# Patient Record
Sex: MALE | Race: OTHER | HISPANIC OR LATINO
[De-identification: names, ages, dates, MRNs, and addresses within clinical notes are randomized per-mention and may not be internally consistent; named-entity substitution may affect disease eponyms.]

---

## 2019-10-17 ENCOUNTER — TRANSCRIPTION ENCOUNTER (OUTPATIENT)
Age: 44
End: 2019-10-17

## 2019-10-17 ENCOUNTER — INPATIENT (INPATIENT)
Facility: HOSPITAL | Age: 44
LOS: 2 days | Discharge: HOME | DRG: 482 | End: 2019-10-20
Attending: SURGERY | Admitting: SURGERY
Payer: COMMERCIAL

## 2019-10-17 VITALS
WEIGHT: 205.03 LBS | TEMPERATURE: 98 F | OXYGEN SATURATION: 98 % | HEART RATE: 95 BPM | HEIGHT: 65 IN | DIASTOLIC BLOOD PRESSURE: 82 MMHG | SYSTOLIC BLOOD PRESSURE: 155 MMHG | RESPIRATION RATE: 18 BRPM

## 2019-10-17 DIAGNOSIS — S72.91XA UNSPECIFIED FRACTURE OF RIGHT FEMUR, INITIAL ENCOUNTER FOR CLOSED FRACTURE: ICD-10-CM

## 2019-10-17 LAB
ALBUMIN SERPL ELPH-MCNC: 4.3 G/DL — SIGNIFICANT CHANGE UP (ref 3.3–5.2)
ALP SERPL-CCNC: 79 U/L — SIGNIFICANT CHANGE UP (ref 40–120)
ALT FLD-CCNC: 33 U/L — SIGNIFICANT CHANGE UP
ANION GAP SERPL CALC-SCNC: 12 MMOL/L — SIGNIFICANT CHANGE UP (ref 5–17)
APTT BLD: 26.8 SEC — LOW (ref 27.5–36.3)
AST SERPL-CCNC: 26 U/L — SIGNIFICANT CHANGE UP
BASOPHILS # BLD AUTO: 0.05 K/UL — SIGNIFICANT CHANGE UP (ref 0–0.2)
BASOPHILS NFR BLD AUTO: 0.4 % — SIGNIFICANT CHANGE UP (ref 0–2)
BILIRUB SERPL-MCNC: <0.2 MG/DL — LOW (ref 0.4–2)
BLD GP AB SCN SERPL QL: SIGNIFICANT CHANGE UP
BUN SERPL-MCNC: 12 MG/DL — SIGNIFICANT CHANGE UP (ref 8–20)
CALCIUM SERPL-MCNC: 9.1 MG/DL — SIGNIFICANT CHANGE UP (ref 8.6–10.2)
CHLORIDE SERPL-SCNC: 102 MMOL/L — SIGNIFICANT CHANGE UP (ref 98–107)
CO2 SERPL-SCNC: 26 MMOL/L — SIGNIFICANT CHANGE UP (ref 22–29)
CREAT SERPL-MCNC: 0.72 MG/DL — SIGNIFICANT CHANGE UP (ref 0.5–1.3)
EOSINOPHIL # BLD AUTO: 0.09 K/UL — SIGNIFICANT CHANGE UP (ref 0–0.5)
EOSINOPHIL NFR BLD AUTO: 0.7 % — SIGNIFICANT CHANGE UP (ref 0–6)
GLUCOSE SERPL-MCNC: 109 MG/DL — SIGNIFICANT CHANGE UP (ref 70–115)
HCT VFR BLD CALC: 44.9 % — SIGNIFICANT CHANGE UP (ref 39–50)
HGB BLD-MCNC: 14.3 G/DL — SIGNIFICANT CHANGE UP (ref 13–17)
IMM GRANULOCYTES NFR BLD AUTO: 0.2 % — SIGNIFICANT CHANGE UP (ref 0–1.5)
INR BLD: 0.93 RATIO — SIGNIFICANT CHANGE UP (ref 0.88–1.16)
LYMPHOCYTES # BLD AUTO: 2.74 K/UL — SIGNIFICANT CHANGE UP (ref 1–3.3)
LYMPHOCYTES # BLD AUTO: 22.7 % — SIGNIFICANT CHANGE UP (ref 13–44)
MCHC RBC-ENTMCNC: 27.8 PG — SIGNIFICANT CHANGE UP (ref 27–34)
MCHC RBC-ENTMCNC: 31.8 GM/DL — LOW (ref 32–36)
MCV RBC AUTO: 87.4 FL — SIGNIFICANT CHANGE UP (ref 80–100)
MONOCYTES # BLD AUTO: 0.83 K/UL — SIGNIFICANT CHANGE UP (ref 0–0.9)
MONOCYTES NFR BLD AUTO: 6.9 % — SIGNIFICANT CHANGE UP (ref 2–14)
NEUTROPHILS # BLD AUTO: 8.33 K/UL — HIGH (ref 1.8–7.4)
NEUTROPHILS NFR BLD AUTO: 69.1 % — SIGNIFICANT CHANGE UP (ref 43–77)
PLATELET # BLD AUTO: 212 K/UL — SIGNIFICANT CHANGE UP (ref 150–400)
POTASSIUM SERPL-MCNC: 4.2 MMOL/L — SIGNIFICANT CHANGE UP (ref 3.5–5.3)
POTASSIUM SERPL-SCNC: 4.2 MMOL/L — SIGNIFICANT CHANGE UP (ref 3.5–5.3)
PROT SERPL-MCNC: 7.4 G/DL — SIGNIFICANT CHANGE UP (ref 6.6–8.7)
PROTHROM AB SERPL-ACNC: 10.7 SEC — SIGNIFICANT CHANGE UP (ref 10–12.9)
RBC # BLD: 5.14 M/UL — SIGNIFICANT CHANGE UP (ref 4.2–5.8)
RBC # FLD: 12 % — SIGNIFICANT CHANGE UP (ref 10.3–14.5)
SODIUM SERPL-SCNC: 140 MMOL/L — SIGNIFICANT CHANGE UP (ref 135–145)
WBC # BLD: 12.07 K/UL — HIGH (ref 3.8–10.5)
WBC # FLD AUTO: 12.07 K/UL — HIGH (ref 3.8–10.5)

## 2019-10-17 PROCEDURE — 99221 1ST HOSP IP/OBS SF/LOW 40: CPT | Mod: 57

## 2019-10-17 PROCEDURE — 72128 CT CHEST SPINE W/O DYE: CPT | Mod: 26

## 2019-10-17 PROCEDURE — 73590 X-RAY EXAM OF LOWER LEG: CPT | Mod: 26,RT

## 2019-10-17 PROCEDURE — 71045 X-RAY EXAM CHEST 1 VIEW: CPT | Mod: 26

## 2019-10-17 PROCEDURE — 99284 EMERGENCY DEPT VISIT MOD MDM: CPT

## 2019-10-17 PROCEDURE — 73552 X-RAY EXAM OF FEMUR 2/>: CPT | Mod: 26,RT

## 2019-10-17 PROCEDURE — 73564 X-RAY EXAM KNEE 4 OR MORE: CPT | Mod: 26,RT

## 2019-10-17 PROCEDURE — 73700 CT LOWER EXTREMITY W/O DYE: CPT | Mod: 26,RT

## 2019-10-17 PROCEDURE — 72125 CT NECK SPINE W/O DYE: CPT | Mod: 26

## 2019-10-17 PROCEDURE — 73610 X-RAY EXAM OF ANKLE: CPT | Mod: 26,RT

## 2019-10-17 PROCEDURE — 72131 CT LUMBAR SPINE W/O DYE: CPT | Mod: 26

## 2019-10-17 PROCEDURE — 93010 ELECTROCARDIOGRAM REPORT: CPT

## 2019-10-17 PROCEDURE — 73522 X-RAY EXAM HIPS BI 3-4 VIEWS: CPT | Mod: 26

## 2019-10-17 RX ORDER — SODIUM CHLORIDE 9 MG/ML
1000 INJECTION, SOLUTION INTRAVENOUS
Refills: 0 | Status: DISCONTINUED | OUTPATIENT
Start: 2019-10-17 | End: 2019-10-17

## 2019-10-17 RX ORDER — HYDROMORPHONE HYDROCHLORIDE 2 MG/ML
0.5 INJECTION INTRAMUSCULAR; INTRAVENOUS; SUBCUTANEOUS
Refills: 0 | Status: DISCONTINUED | OUTPATIENT
Start: 2019-10-17 | End: 2019-10-18

## 2019-10-17 RX ORDER — ONDANSETRON 8 MG/1
4 TABLET, FILM COATED ORAL EVERY 6 HOURS
Refills: 0 | Status: DISCONTINUED | OUTPATIENT
Start: 2019-10-17 | End: 2019-10-18

## 2019-10-17 RX ORDER — ENOXAPARIN SODIUM 100 MG/ML
30 INJECTION SUBCUTANEOUS ONCE
Refills: 0 | Status: COMPLETED | OUTPATIENT
Start: 2019-10-17 | End: 2019-10-17

## 2019-10-17 RX ORDER — SENNA PLUS 8.6 MG/1
2 TABLET ORAL AT BEDTIME
Refills: 0 | Status: DISCONTINUED | OUTPATIENT
Start: 2019-10-17 | End: 2019-10-18

## 2019-10-17 RX ORDER — MORPHINE SULFATE 50 MG/1
4 CAPSULE, EXTENDED RELEASE ORAL ONCE
Refills: 0 | Status: DISCONTINUED | OUTPATIENT
Start: 2019-10-17 | End: 2019-10-17

## 2019-10-17 RX ADMIN — SENNA PLUS 2 TABLET(S): 8.6 TABLET ORAL at 22:05

## 2019-10-17 RX ADMIN — MORPHINE SULFATE 4 MILLIGRAM(S): 50 CAPSULE, EXTENDED RELEASE ORAL at 16:39

## 2019-10-17 RX ADMIN — ENOXAPARIN SODIUM 30 MILLIGRAM(S): 100 INJECTION SUBCUTANEOUS at 20:01

## 2019-10-17 RX ADMIN — MORPHINE SULFATE 4 MILLIGRAM(S): 50 CAPSULE, EXTENDED RELEASE ORAL at 19:56

## 2019-10-17 NOTE — ED PROVIDER NOTE - ATTENDING CONTRIBUTION TO CARE
I, Violet Morrell, performed the initial face to face bedside interview with this patient regarding history of present illness, review of symptoms and relevant past medical, social and family history.  I completed an independent physical examination.  I was the initial provider who evaluated this patient. I have signed out the follow up of any pending tests (i.e. labs, radiological studies) to the ACP.  I have communicated the patient’s plan of care and disposition with the ACP.  fell off truck + femur fracture  to be admitted to either trauma or ortho.  agree w care plan

## 2019-10-17 NOTE — H&P ADULT - ASSESSMENT
This is a 44 year old M with no PMHx who presents to the ED s/p trip and fall off a loading truck while at work today.  Now with a distal femur fx  -Given mechanism, will order full spine Ct-scans  -Admit to Trauma  -Preop for OR 18/19 with Ortho  -DVT, hold AM dose

## 2019-10-17 NOTE — ED PROVIDER NOTE - PHYSICAL EXAMINATION
Awake, alert, mild distress secondary to pain   Lungs CTA, = chest rise and fall   abd soft and nontender to palp  R knee with obvious deformity, diffusely TTP, large amount of swelling above the R knee, R femur TTP, R ankle nontender to palp, able to move all toes, able to flex and extend ankle, unable to flex or extend knee  + DP and PT pulses  A/O x 3, no focal neuro deficits, nl sensation to all toes   No lacs, abrasions noted

## 2019-10-17 NOTE — H&P ADULT - HISTORY OF PRESENT ILLNESS
This is a 44 year old M with no PMHx who presents to the ED s/p trip and fall off a loading truck while at work today. Pt states he was unloading items from the truck and as he was walking backwards he missed a step and landed on standing position. His right knee "gave away". Unable to ambulate after accident. Currently c/o R knee, R leg and R hip pain. Denies trauma to the head, no LOC. Denies HA, diplopia, chest pain, SOB, abdominal pain, upper extremity pain, left lower extremity pain or back pain.

## 2019-10-17 NOTE — CONSULT NOTE ADULT - ATTENDING COMMENTS
Ortho Trauma Attending:  Agree with above resident note.  Note edited where necessary.  Orthopedic Surgery is ready to proceed with surgery pending medical optimization and adequate Operating Room availability. Planning on surgery tomorrow for ORIF femur.     Gopal Madsen MD  Orthopaedic Trauma Surgery

## 2019-10-17 NOTE — ED ADULT NURSE NOTE - OBJECTIVE STATEMENT
43yo male c/o slip/fall out of work van this am landing on right leg. pt states he was unable to bear weight to RLE after incident. pain increasing. denies loc, hitting head or anticoagulant use.

## 2019-10-17 NOTE — H&P ADULT - NSHPPHYSICALEXAM_GEN_ALL_CORE
Gen: AAOX3, NAD  Head: AT/NC  Eyes: pupil 4mm bilaterally equally reactive, EOMI  Neck: no cervical spine tenderness or step-off  Chest: CTAB, no chest wall tenderness or crepitus  Abd: S, ND, NT  Pelvis: stable  Ext: RLE in a knee immobilizer, motor intact at the right foot, motor 5/5 on all other extremities, no deformities  Vasc: bounding pulses all throughout   Back: no spinal tenderness, no step off  Neuro: no focal deficits, sensory intact, CN II-XII intact

## 2019-10-17 NOTE — CONSULT NOTE ADULT - SUBJECTIVE AND OBJECTIVE BOX
Patient is a 44 year old Hungarian-speaking male presents with right knee pain status post mechanical fall at work. Patient was a community ambulator at baseline. Patient denies numbness, tingling, or paresthesias in affected limb. Patient denies headstrike or loss of consciousness and denies any other orthopedic injuries at this time. He states he does take "freon" which is a [place] pain medicine he gets at the [facility]. He states it is comparable to a tylenol.     PAST MEDICAL & SURGICAL HISTORY:  Denies  Chronic deformity of femur and hip.     Allergies    No Known Allergies    Intolerances      Imaging: Xrays of the right knee demonstrates a medial distal femur fracture.     Physical Exam:  General: Not in acute distress  Right Lower Extremity: Skin intact. No tenderness to palpation of the Hip/Ankle/Foot/Toes. Positive log roll test and positive heel strike. Extensor hallucis longus, Flexor hallucis longus, Tibialis anterior, and Gastrocnemius/Soleus complex intact. Sensation intact to light touch in L2-S1 nerve distribution. Dorsalis Pedis and Posterior Tibialis pulses are 2+. Compartments are soft and compressible. No tenderness to palpation of calves.     Secondary Survey: Full range of motion of unaffected extremities, sensation intact to light touch globally, compartments soft, no bony tenderness to palpation over bony prominences, no calf tenderness to palpation, no tenderness to palpation along axial spine.

## 2019-10-17 NOTE — ED ADULT NURSE REASSESSMENT NOTE - NS ED NURSE REASSESS COMMENT FT1
RLE immobilizer and ace wrap applied by ortho. toes warm and mobile, cap refil brisk, sensation intact.
Report received from offgoing RN, charting as noted. Patient is A&Ox4, c/o some leg pain but states he just received medication.   at bedside for assistance. Patient has right lower extremity immobilized with ace bandage and knee immobilizer in place.

## 2019-10-17 NOTE — ED PROVIDER NOTE - OBJECTIVE STATEMENT
44 year old male presents to the ED s/p trip and fall off a truck while at work today. Pt states he was stepping down from his truck when he lost his footing and landed on his R knee while it was bent. Since the fall pt has been unable to walk. Currently c/o R knee, R leg and R hip pain. Pt did not hit his head, no LOC. Not on blood thinners. Denies lacs, abrasions, HA, abd pain, SOB, N/V/D.

## 2019-10-17 NOTE — ED PROVIDER NOTE - CLINICAL SUMMARY MEDICAL DECISION MAKING FREE TEXT BOX
44 year old male presents to the ED for R knee pain s/p falling on bent knee while stepping down from a truck. Will get xrays R knee, femur, hip, tib/fib and ankle, and treat pain.

## 2019-10-17 NOTE — CONSULT NOTE ADULT - ASSESSMENT
43 y/o male with right distal femur fx    -OR for ORIF on 10/18/19  -NPO after midnight except meds  -Hold anticoagulation after midnight  -IV fluids while npo  -NWB on RLE in bulky ponce knee immobilizer  -Dr. Madsen aware and agrees with the above

## 2019-10-18 LAB
ABO RH CONFIRMATION: SIGNIFICANT CHANGE UP
ANION GAP SERPL CALC-SCNC: 9 MMOL/L — SIGNIFICANT CHANGE UP (ref 5–17)
BASOPHILS # BLD AUTO: 0.04 K/UL — SIGNIFICANT CHANGE UP (ref 0–0.2)
BASOPHILS NFR BLD AUTO: 0.4 % — SIGNIFICANT CHANGE UP (ref 0–2)
BUN SERPL-MCNC: 12 MG/DL — SIGNIFICANT CHANGE UP (ref 8–20)
CALCIUM SERPL-MCNC: 9 MG/DL — SIGNIFICANT CHANGE UP (ref 8.6–10.2)
CHLORIDE SERPL-SCNC: 101 MMOL/L — SIGNIFICANT CHANGE UP (ref 98–107)
CO2 SERPL-SCNC: 29 MMOL/L — SIGNIFICANT CHANGE UP (ref 22–29)
CREAT SERPL-MCNC: 0.74 MG/DL — SIGNIFICANT CHANGE UP (ref 0.5–1.3)
EOSINOPHIL # BLD AUTO: 0.13 K/UL — SIGNIFICANT CHANGE UP (ref 0–0.5)
EOSINOPHIL NFR BLD AUTO: 1.3 % — SIGNIFICANT CHANGE UP (ref 0–6)
GLUCOSE SERPL-MCNC: 115 MG/DL — SIGNIFICANT CHANGE UP (ref 70–115)
HCT VFR BLD CALC: 43 % — SIGNIFICANT CHANGE UP (ref 39–50)
HGB BLD-MCNC: 13.7 G/DL — SIGNIFICANT CHANGE UP (ref 13–17)
IMM GRANULOCYTES NFR BLD AUTO: 0.4 % — SIGNIFICANT CHANGE UP (ref 0–1.5)
LYMPHOCYTES # BLD AUTO: 2.46 K/UL — SIGNIFICANT CHANGE UP (ref 1–3.3)
LYMPHOCYTES # BLD AUTO: 24.8 % — SIGNIFICANT CHANGE UP (ref 13–44)
MAGNESIUM SERPL-MCNC: 2.1 MG/DL — SIGNIFICANT CHANGE UP (ref 1.6–2.6)
MCHC RBC-ENTMCNC: 27.8 PG — SIGNIFICANT CHANGE UP (ref 27–34)
MCHC RBC-ENTMCNC: 31.9 GM/DL — LOW (ref 32–36)
MCV RBC AUTO: 87.4 FL — SIGNIFICANT CHANGE UP (ref 80–100)
MONOCYTES # BLD AUTO: 0.87 K/UL — SIGNIFICANT CHANGE UP (ref 0–0.9)
MONOCYTES NFR BLD AUTO: 8.8 % — SIGNIFICANT CHANGE UP (ref 2–14)
NEUTROPHILS # BLD AUTO: 6.36 K/UL — SIGNIFICANT CHANGE UP (ref 1.8–7.4)
NEUTROPHILS NFR BLD AUTO: 64.3 % — SIGNIFICANT CHANGE UP (ref 43–77)
PLATELET # BLD AUTO: 199 K/UL — SIGNIFICANT CHANGE UP (ref 150–400)
POTASSIUM SERPL-MCNC: 4.4 MMOL/L — SIGNIFICANT CHANGE UP (ref 3.5–5.3)
POTASSIUM SERPL-SCNC: 4.4 MMOL/L — SIGNIFICANT CHANGE UP (ref 3.5–5.3)
RBC # BLD: 4.92 M/UL — SIGNIFICANT CHANGE UP (ref 4.2–5.8)
RBC # FLD: 12.3 % — SIGNIFICANT CHANGE UP (ref 10.3–14.5)
SODIUM SERPL-SCNC: 139 MMOL/L — SIGNIFICANT CHANGE UP (ref 135–145)
WBC # BLD: 9.9 K/UL — SIGNIFICANT CHANGE UP (ref 3.8–10.5)
WBC # FLD AUTO: 9.9 K/UL — SIGNIFICANT CHANGE UP (ref 3.8–10.5)

## 2019-10-18 PROCEDURE — 27514 TREATMENT OF THIGH FRACTURE: CPT | Mod: RT

## 2019-10-18 PROCEDURE — 73562 X-RAY EXAM OF KNEE 3: CPT | Mod: 26,RT

## 2019-10-18 PROCEDURE — 99232 SBSQ HOSP IP/OBS MODERATE 35: CPT

## 2019-10-18 PROCEDURE — 27514 TREATMENT OF THIGH FRACTURE: CPT | Mod: AS,RT

## 2019-10-18 RX ORDER — OXYCODONE HYDROCHLORIDE 5 MG/1
10 TABLET ORAL EVERY 4 HOURS
Refills: 0 | Status: DISCONTINUED | OUTPATIENT
Start: 2019-10-18 | End: 2019-10-20

## 2019-10-18 RX ORDER — SODIUM CHLORIDE 9 MG/ML
1000 INJECTION, SOLUTION INTRAVENOUS
Refills: 0 | Status: DISCONTINUED | OUTPATIENT
Start: 2019-10-18 | End: 2019-10-18

## 2019-10-18 RX ORDER — OXYCODONE HYDROCHLORIDE 5 MG/1
5 TABLET ORAL EVERY 4 HOURS
Refills: 0 | Status: DISCONTINUED | OUTPATIENT
Start: 2019-10-18 | End: 2019-10-20

## 2019-10-18 RX ORDER — SODIUM CHLORIDE 9 MG/ML
1000 INJECTION, SOLUTION INTRAVENOUS
Refills: 0 | Status: DISCONTINUED | OUTPATIENT
Start: 2019-10-18 | End: 2019-10-19

## 2019-10-18 RX ORDER — HYDROMORPHONE HYDROCHLORIDE 2 MG/ML
0.5 INJECTION INTRAMUSCULAR; INTRAVENOUS; SUBCUTANEOUS
Refills: 0 | Status: DISCONTINUED | OUTPATIENT
Start: 2019-10-18 | End: 2019-10-18

## 2019-10-18 RX ORDER — ONDANSETRON 8 MG/1
4 TABLET, FILM COATED ORAL ONCE
Refills: 0 | Status: DISCONTINUED | OUTPATIENT
Start: 2019-10-18 | End: 2019-10-20

## 2019-10-18 RX ORDER — CEFAZOLIN SODIUM 1 G
2000 VIAL (EA) INJECTION
Refills: 0 | Status: COMPLETED | OUTPATIENT
Start: 2019-10-18 | End: 2019-10-19

## 2019-10-18 RX ORDER — SENNA PLUS 8.6 MG/1
2 TABLET ORAL AT BEDTIME
Refills: 0 | Status: DISCONTINUED | OUTPATIENT
Start: 2019-10-18 | End: 2019-10-20

## 2019-10-18 RX ORDER — ACETAMINOPHEN 500 MG
80 TABLET ORAL EVERY 6 HOURS
Refills: 0 | Status: DISCONTINUED | OUTPATIENT
Start: 2019-10-18 | End: 2019-10-18

## 2019-10-18 RX ORDER — CEFAZOLIN SODIUM 1 G
2000 VIAL (EA) INJECTION ONCE
Refills: 0 | Status: DISCONTINUED | OUTPATIENT
Start: 2019-10-18 | End: 2019-10-18

## 2019-10-18 RX ORDER — ACETAMINOPHEN 500 MG
650 TABLET ORAL EVERY 6 HOURS
Refills: 0 | Status: DISCONTINUED | OUTPATIENT
Start: 2019-10-18 | End: 2019-10-19

## 2019-10-18 RX ORDER — HYDROMORPHONE HYDROCHLORIDE 2 MG/ML
1 INJECTION INTRAMUSCULAR; INTRAVENOUS; SUBCUTANEOUS
Refills: 0 | Status: DISCONTINUED | OUTPATIENT
Start: 2019-10-18 | End: 2019-10-18

## 2019-10-18 RX ORDER — ONDANSETRON 8 MG/1
4 TABLET, FILM COATED ORAL ONCE
Refills: 0 | Status: DISCONTINUED | OUTPATIENT
Start: 2019-10-18 | End: 2019-10-18

## 2019-10-18 RX ORDER — ENOXAPARIN SODIUM 100 MG/ML
30 INJECTION SUBCUTANEOUS
Refills: 0 | Status: DISCONTINUED | OUTPATIENT
Start: 2019-10-19 | End: 2019-10-20

## 2019-10-18 RX ADMIN — SODIUM CHLORIDE 120 MILLILITER(S): 9 INJECTION, SOLUTION INTRAVENOUS at 05:36

## 2019-10-18 RX ADMIN — SENNA PLUS 2 TABLET(S): 8.6 TABLET ORAL at 21:46

## 2019-10-18 RX ADMIN — SODIUM CHLORIDE 120 MILLILITER(S): 9 INJECTION, SOLUTION INTRAVENOUS at 09:26

## 2019-10-18 RX ADMIN — HYDROMORPHONE HYDROCHLORIDE 0.5 MILLIGRAM(S): 2 INJECTION INTRAMUSCULAR; INTRAVENOUS; SUBCUTANEOUS at 09:26

## 2019-10-18 RX ADMIN — HYDROMORPHONE HYDROCHLORIDE 0.5 MILLIGRAM(S): 2 INJECTION INTRAMUSCULAR; INTRAVENOUS; SUBCUTANEOUS at 05:36

## 2019-10-18 RX ADMIN — HYDROMORPHONE HYDROCHLORIDE 0.5 MILLIGRAM(S): 2 INJECTION INTRAMUSCULAR; INTRAVENOUS; SUBCUTANEOUS at 07:41

## 2019-10-18 RX ADMIN — Medication 100 MILLIGRAM(S): at 21:46

## 2019-10-19 ENCOUNTER — TRANSCRIPTION ENCOUNTER (OUTPATIENT)
Age: 44
End: 2019-10-19

## 2019-10-19 LAB
ANION GAP SERPL CALC-SCNC: 10 MMOL/L — SIGNIFICANT CHANGE UP (ref 5–17)
BASOPHILS # BLD AUTO: 0.03 K/UL — SIGNIFICANT CHANGE UP (ref 0–0.2)
BASOPHILS NFR BLD AUTO: 0.2 % — SIGNIFICANT CHANGE UP (ref 0–2)
BUN SERPL-MCNC: 11 MG/DL — SIGNIFICANT CHANGE UP (ref 8–20)
CALCIUM SERPL-MCNC: 8.2 MG/DL — LOW (ref 8.6–10.2)
CHLORIDE SERPL-SCNC: 102 MMOL/L — SIGNIFICANT CHANGE UP (ref 98–107)
CO2 SERPL-SCNC: 28 MMOL/L — SIGNIFICANT CHANGE UP (ref 22–29)
CREAT SERPL-MCNC: 0.72 MG/DL — SIGNIFICANT CHANGE UP (ref 0.5–1.3)
EOSINOPHIL # BLD AUTO: 0.01 K/UL — SIGNIFICANT CHANGE UP (ref 0–0.5)
EOSINOPHIL NFR BLD AUTO: 0.1 % — SIGNIFICANT CHANGE UP (ref 0–6)
GLUCOSE SERPL-MCNC: 138 MG/DL — HIGH (ref 70–115)
HCT VFR BLD CALC: 38 % — LOW (ref 39–50)
HGB BLD-MCNC: 12 G/DL — LOW (ref 13–17)
IMM GRANULOCYTES NFR BLD AUTO: 0.5 % — SIGNIFICANT CHANGE UP (ref 0–1.5)
LYMPHOCYTES # BLD AUTO: 1.89 K/UL — SIGNIFICANT CHANGE UP (ref 1–3.3)
LYMPHOCYTES # BLD AUTO: 14.7 % — SIGNIFICANT CHANGE UP (ref 13–44)
MAGNESIUM SERPL-MCNC: 1.9 MG/DL — SIGNIFICANT CHANGE UP (ref 1.6–2.6)
MCHC RBC-ENTMCNC: 27.5 PG — SIGNIFICANT CHANGE UP (ref 27–34)
MCHC RBC-ENTMCNC: 31.6 GM/DL — LOW (ref 32–36)
MCV RBC AUTO: 87 FL — SIGNIFICANT CHANGE UP (ref 80–100)
MONOCYTES # BLD AUTO: 1.18 K/UL — HIGH (ref 0–0.9)
MONOCYTES NFR BLD AUTO: 9.2 % — SIGNIFICANT CHANGE UP (ref 2–14)
NEUTROPHILS # BLD AUTO: 9.66 K/UL — HIGH (ref 1.8–7.4)
NEUTROPHILS NFR BLD AUTO: 75.3 % — SIGNIFICANT CHANGE UP (ref 43–77)
PHOSPHATE SERPL-MCNC: 3.1 MG/DL — SIGNIFICANT CHANGE UP (ref 2.4–4.7)
PLATELET # BLD AUTO: 186 K/UL — SIGNIFICANT CHANGE UP (ref 150–400)
POTASSIUM SERPL-MCNC: 4.1 MMOL/L — SIGNIFICANT CHANGE UP (ref 3.5–5.3)
POTASSIUM SERPL-SCNC: 4.1 MMOL/L — SIGNIFICANT CHANGE UP (ref 3.5–5.3)
RBC # BLD: 4.37 M/UL — SIGNIFICANT CHANGE UP (ref 4.2–5.8)
RBC # FLD: 12.3 % — SIGNIFICANT CHANGE UP (ref 10.3–14.5)
SODIUM SERPL-SCNC: 140 MMOL/L — SIGNIFICANT CHANGE UP (ref 135–145)
WBC # BLD: 12.84 K/UL — HIGH (ref 3.8–10.5)
WBC # FLD AUTO: 12.84 K/UL — HIGH (ref 3.8–10.5)

## 2019-10-19 PROCEDURE — 99024 POSTOP FOLLOW-UP VISIT: CPT

## 2019-10-19 RX ORDER — MAGNESIUM SULFATE 500 MG/ML
2 VIAL (ML) INJECTION ONCE
Refills: 0 | Status: COMPLETED | OUTPATIENT
Start: 2019-10-19 | End: 2019-10-19

## 2019-10-19 RX ORDER — ACETAMINOPHEN 500 MG
650 TABLET ORAL EVERY 4 HOURS
Refills: 0 | Status: DISCONTINUED | OUTPATIENT
Start: 2019-10-19 | End: 2019-10-20

## 2019-10-19 RX ORDER — INFLUENZA VIRUS VACCINE 15; 15; 15; 15 UG/.5ML; UG/.5ML; UG/.5ML; UG/.5ML
0.5 SUSPENSION INTRAMUSCULAR ONCE
Refills: 0 | Status: DISCONTINUED | OUTPATIENT
Start: 2019-10-19 | End: 2019-10-20

## 2019-10-19 RX ORDER — HYDROMORPHONE HYDROCHLORIDE 2 MG/ML
0.5 INJECTION INTRAMUSCULAR; INTRAVENOUS; SUBCUTANEOUS EVERY 4 HOURS
Refills: 0 | Status: DISCONTINUED | OUTPATIENT
Start: 2019-10-19 | End: 2019-10-20

## 2019-10-19 RX ADMIN — OXYCODONE HYDROCHLORIDE 10 MILLIGRAM(S): 5 TABLET ORAL at 09:15

## 2019-10-19 RX ADMIN — ENOXAPARIN SODIUM 30 MILLIGRAM(S): 100 INJECTION SUBCUTANEOUS at 17:11

## 2019-10-19 RX ADMIN — Medication 100 MILLIGRAM(S): at 04:10

## 2019-10-19 RX ADMIN — ENOXAPARIN SODIUM 30 MILLIGRAM(S): 100 INJECTION SUBCUTANEOUS at 05:17

## 2019-10-19 RX ADMIN — OXYCODONE HYDROCHLORIDE 5 MILLIGRAM(S): 5 TABLET ORAL at 01:39

## 2019-10-19 RX ADMIN — Medication 50 GRAM(S): at 09:53

## 2019-10-19 RX ADMIN — SENNA PLUS 2 TABLET(S): 8.6 TABLET ORAL at 21:48

## 2019-10-19 RX ADMIN — OXYCODONE HYDROCHLORIDE 10 MILLIGRAM(S): 5 TABLET ORAL at 08:18

## 2019-10-19 RX ADMIN — OXYCODONE HYDROCHLORIDE 5 MILLIGRAM(S): 5 TABLET ORAL at 02:05

## 2019-10-19 NOTE — DISCHARGE NOTE PROVIDER - HOSPITAL COURSE
Pt is a 44 year old male admitted with a R sided femur fracture. He was evaluated by orthopedics and taken to the OR on 10/18 for ORIF of his R femur. He did well post operatively. He was evaluated by PT who trained him on the use of crutches and recommended discharge home without outpatient PT. Patient is stable: tolerating diet, voiding, ambulating, pain well controlled.

## 2019-10-19 NOTE — DISCHARGE NOTE PROVIDER - CARE PROVIDER_API CALL
Gopal Madsen)  Orthopaedic Surgery  [address] [place] [address]  Phone: [phone]  Fax: [phone]  Follow Up Time:

## 2019-10-19 NOTE — PHYSICAL THERAPY INITIAL EVALUATION ADULT - PERTINENT HX OF CURRENT PROBLEM, REHAB EVAL
pt presents to [facility] due to s/p fall/trip off landing deck, s/p right distal femur fracture, right fibula head fracture, lateral tibial plateau fracture s/p ORIF of right distal femur 10/18

## 2019-10-19 NOTE — DISCHARGE NOTE PROVIDER - NSDCFUADDINST_GEN_ALL_CORE_FT
ORTHOPEDIC DISCHARGE INSTRUCTIONS: The patient will be seen in the office on 11/8/19. Call office to confirm appointment. The patient will contact the office if the wound becomes red, has increasing pain, develops bleeding or discharge, an injury occurs, or has other concerns. The patient will continue  ASPIRIN for DVTP . The patient will take Oxycodone and Tylenol for pain control and titrate according to prescription and patient needs. The patient is NON-weight bearing on the Right lower extremity, full Range of motion of knee is ok. The patient is recommended to elevated the affected extremity to reduce swelling. ORTHOPEDIC DISCHARGE INSTRUCTIONS: The patient will be seen in the office on 11/8/19. Call office to confirm appointment. The patient will contact the office if the wound becomes red, has increasing pain, develops bleeding or discharge, an injury occurs, or has other concerns. The patient will continue  ASPIRIN for DVT prophylaxis . The patient will take Oxycodone and Tylenol for pain control and titrate according to prescription and patient needs. The patient is NON-weight bearing on the Right lower extremity, full Range of motion of knee is ok. The patient is recommended to elevated the affected extremity to reduce swelling. ORTHOPEDIC DISCHARGE INSTRUCTIONS: The patient will be seen in the office on 11/8/19. Call office to confirm appointment. The patient will contact the office if the wound becomes red, has increasing pain, develops bleeding or discharge, an injury occurs, or has other concerns. The patient will continue  ASPIRIN 325 BID for DVT prophylaxis . The patient will take Oxycodone and Tylenol for pain control and titrate according to prescription and patient needs. The patient is NON-weight bearing on the Right lower extremity, full Range of motion of knee is ok. The patient is recommended to elevated the affected extremity to reduce swelling.

## 2019-10-19 NOTE — DISCHARGE NOTE PROVIDER - NSDCCPCAREPLAN_GEN_ALL_CORE_FT
PRINCIPAL DISCHARGE DIAGNOSIS  Diagnosis: Femur fracture, right  Assessment and Plan of Treatment: PRINCIPAL DISCHARGE DIAGNOSIS  Diagnosis: Femur fracture, right  Assessment and Plan of Treatment: Non-weight bearing on the right lower extremitiy. Follow up with Dr. Madsen 11/8/19. Continue physical therapy. Continue regular diet. PRINCIPAL DISCHARGE DIAGNOSIS  Diagnosis: Femur fracture, right  Assessment and Plan of Treatment: Non-weight bearing on the right lower extremitiy. Follow up with Dr. Madsen 2-3 weeks. Continue physical therapy. Continue regular diet. Please take aspirin 325mg twice daily until follow up. For pain, please take tylenol 650mg every 6 hours as needed. If pain is uncontrolled, please take oxycodone 5mg every 8 hours as needed for breakthrough pain

## 2019-10-20 ENCOUNTER — TRANSCRIPTION ENCOUNTER (OUTPATIENT)
Age: 44
End: 2019-10-20

## 2019-10-20 VITALS
SYSTOLIC BLOOD PRESSURE: 144 MMHG | OXYGEN SATURATION: 99 % | HEART RATE: 90 BPM | RESPIRATION RATE: 19 BRPM | DIASTOLIC BLOOD PRESSURE: 84 MMHG | TEMPERATURE: 99 F

## 2019-10-20 DIAGNOSIS — S72.91XA UNSPECIFIED FRACTURE OF RIGHT FEMUR, INITIAL ENCOUNTER FOR CLOSED FRACTURE: ICD-10-CM

## 2019-10-20 PROCEDURE — 86901 BLOOD TYPING SEROLOGIC RH(D): CPT

## 2019-10-20 PROCEDURE — 76000 FLUOROSCOPY <1 HR PHYS/QHP: CPT

## 2019-10-20 PROCEDURE — 36415 COLL VENOUS BLD VENIPUNCTURE: CPT

## 2019-10-20 PROCEDURE — 99285 EMERGENCY DEPT VISIT HI MDM: CPT | Mod: 25

## 2019-10-20 PROCEDURE — 99232 SBSQ HOSP IP/OBS MODERATE 35: CPT

## 2019-10-20 PROCEDURE — 72125 CT NECK SPINE W/O DYE: CPT

## 2019-10-20 PROCEDURE — 85610 PROTHROMBIN TIME: CPT

## 2019-10-20 PROCEDURE — 85730 THROMBOPLASTIN TIME PARTIAL: CPT

## 2019-10-20 PROCEDURE — 86850 RBC ANTIBODY SCREEN: CPT

## 2019-10-20 PROCEDURE — 83735 ASSAY OF MAGNESIUM: CPT

## 2019-10-20 PROCEDURE — 86900 BLOOD TYPING SEROLOGIC ABO: CPT

## 2019-10-20 PROCEDURE — 80048 BASIC METABOLIC PNL TOTAL CA: CPT

## 2019-10-20 PROCEDURE — 73610 X-RAY EXAM OF ANKLE: CPT

## 2019-10-20 PROCEDURE — 84100 ASSAY OF PHOSPHORUS: CPT

## 2019-10-20 PROCEDURE — 96374 THER/PROPH/DIAG INJ IV PUSH: CPT

## 2019-10-20 PROCEDURE — 71045 X-RAY EXAM CHEST 1 VIEW: CPT

## 2019-10-20 PROCEDURE — 72128 CT CHEST SPINE W/O DYE: CPT

## 2019-10-20 PROCEDURE — 73590 X-RAY EXAM OF LOWER LEG: CPT

## 2019-10-20 PROCEDURE — 73522 X-RAY EXAM HIPS BI 3-4 VIEWS: CPT

## 2019-10-20 PROCEDURE — 80053 COMPREHEN METABOLIC PANEL: CPT

## 2019-10-20 PROCEDURE — C1889: CPT

## 2019-10-20 PROCEDURE — 97163 PT EVAL HIGH COMPLEX 45 MIN: CPT

## 2019-10-20 PROCEDURE — 73564 X-RAY EXAM KNEE 4 OR MORE: CPT

## 2019-10-20 PROCEDURE — 97167 OT EVAL HIGH COMPLEX 60 MIN: CPT

## 2019-10-20 PROCEDURE — T1013: CPT

## 2019-10-20 PROCEDURE — 93005 ELECTROCARDIOGRAM TRACING: CPT

## 2019-10-20 PROCEDURE — C1713: CPT

## 2019-10-20 PROCEDURE — 73562 X-RAY EXAM OF KNEE 3: CPT

## 2019-10-20 PROCEDURE — 85027 COMPLETE CBC AUTOMATED: CPT

## 2019-10-20 PROCEDURE — 72131 CT LUMBAR SPINE W/O DYE: CPT

## 2019-10-20 PROCEDURE — 73552 X-RAY EXAM OF FEMUR 2/>: CPT

## 2019-10-20 PROCEDURE — 73700 CT LOWER EXTREMITY W/O DYE: CPT

## 2019-10-20 RX ORDER — OXYCODONE HYDROCHLORIDE 5 MG/1
1 TABLET ORAL
Qty: 15 | Refills: 0
Start: 2019-10-20 | End: 2019-10-24

## 2019-10-20 RX ORDER — ASPIRIN/CALCIUM CARB/MAGNESIUM 324 MG
1 TABLET ORAL
Qty: 28 | Refills: 0
Start: 2019-10-20 | End: 2019-11-02

## 2019-10-20 RX ORDER — SENNA PLUS 8.6 MG/1
2 TABLET ORAL
Qty: 0 | Refills: 0 | DISCHARGE
Start: 2019-10-20

## 2019-10-20 RX ORDER — ACETAMINOPHEN 500 MG
2 TABLET ORAL
Qty: 0 | Refills: 0 | DISCHARGE
Start: 2019-10-20

## 2019-10-20 RX ADMIN — OXYCODONE HYDROCHLORIDE 5 MILLIGRAM(S): 5 TABLET ORAL at 06:24

## 2019-10-20 RX ADMIN — OXYCODONE HYDROCHLORIDE 5 MILLIGRAM(S): 5 TABLET ORAL at 06:54

## 2019-10-20 RX ADMIN — ENOXAPARIN SODIUM 30 MILLIGRAM(S): 100 INJECTION SUBCUTANEOUS at 06:24

## 2019-10-20 NOTE — PROGRESS NOTE ADULT - REASON FOR ADMISSION
fall from loading truck, distal femur fx

## 2019-10-20 NOTE — PROGRESS NOTE ADULT - ATTENDING COMMENTS
pain control  PT  DVT proph  ASA at discharge  Dispo
Agree with above assessment.  The patient was seen and examined.  Patient is without complaints.  Abdomen is soft and non tender, right leg with clean incision sites.  Patient is surgically stable for discharge home with outpatient ortho follow up.

## 2019-10-20 NOTE — PROGRESS NOTE ADULT - SUBJECTIVE AND OBJECTIVE BOX
INTERVAL HPI/OVERNIGHT EVENTS:  Patient seen and examined this morning, no acute events overnight  POD#1 s/p ORIF of distal femur fracture  Pain controlled on PO pain meds  Resting comfortably in bed  Denies fever, chills, SOB, chest pain or any other symptom    MEDICATIONS  (STANDING):  enoxaparin Injectable 30 milliGRAM(s) SubCutaneous two times a day  influenza   Vaccine 0.5 milliLiter(s) IntraMuscular once  lactated ringers. 1000 milliLiter(s) (100 mL/Hr) IV Continuous <Continuous>  senna 2 Tablet(s) Oral at bedtime    MEDICATIONS  (PRN):  acetaminophen  Suppository .. 650 milliGRAM(s) Rectal every 6 hours PRN Mild Pain (1 - 3)  ondansetron Injectable 4 milliGRAM(s) IV Push once PRN Nausea and/or Vomiting  oxyCODONE    IR 5 milliGRAM(s) Oral every 4 hours PRN Moderate Pain (4 - 6)  oxyCODONE    IR 10 milliGRAM(s) Oral every 4 hours PRN Severe Pain (7 - 10)      Vital Signs Last 24 Hrs  T(C): 36.9 (19 Oct 2019 00:06), Max: 36.9 (19 Oct 2019 00:06)  T(F): 98.5 (19 Oct 2019 00:06), Max: 98.5 (19 Oct 2019 00:06)  HR: 98 (19 Oct 2019 00:06) (75 - 101)  BP: 116/67 (19 Oct 2019 00:06) (110/70 - 145/85)  BP(mean): --  RR: 17 (19 Oct 2019 00:06) (17 - 21)  SpO2: 97% (19 Oct 2019 00:06) (96% - 99%)    Physical Exam:    Neurological:  No sensory/motor deficits    HEENT: PERRLA, EOMI, no drainage or redness    Neck: No bruits; no thyromegaly or nodules,  No JVD    Back: Normal spine flexure, No CVA tenderness, No deformity or limitation of movement    Respiratory: Breath Sounds equal & clear to auscultation, no accessory muscle use    Cardiovascular: Regular rate & rhythm, normal S1, S2; no murmurs, gallops or rubs    Gastrointestinal: Soft, non-tender, normal bowel sounds    Extremities: Right lower extremity edema (improving), dressing intact, compartments soft, sensation/motor intact, no cyanosis, clubbing.    Vascular: Equal and normal pulses: 2+ peripheral pulses throughout    Musculoskeletal: No joint pain, swelling or deformity; no limitation of movement    Skin: No rashes      I&O's Detail    18 Oct 2019 07:01  -  19 Oct 2019 04:15  --------------------------------------------------------  IN:    lactated ringers.: 600 mL    lactated ringers.: 600 mL    Solution: 50 mL  Total IN: 1250 mL    OUT:    Voided: 1475 mL  Total OUT: 1475 mL    Total NET: -225 mL          LABS:                        13.7   9.90  )-----------( 199      ( 18 Oct 2019 07:13 )             43.0     10-18    139  |  101  |  12.0  ----------------------------<  115  4.4   |  29.0  |  0.74    Ca    9.0      18 Oct 2019 07:13  Mg     2.1     10-18    TPro  7.4  /  Alb  4.3  /  TBili  <0.2<L>  /  DBili  x   /  AST  26  /  ALT  33  /  AlkPhos  79  10-17    PT/INR - ( 17 Oct 2019 16:36 )   PT: 10.7 sec;   INR: 0.93 ratio         PTT - ( 17 Oct 2019 16:36 )  PTT:26.8 sec
Ortho Progress note    Name: NADIR COLLINS    MR #: [id number]    Procedure: right distal femur ORIF  Surgeon: Dr. Madsen    Pt comfortable without complaints, pain controlled  Denies CP, SOB, N/V, numbness/tingling     General Exam:  Vital Signs Last 24 Hrs  T(C): 37 (10-20-19 @ 05:23), Max: 37 (10-20-19 @ 05:23)  T(F): 98.6 (10-20-19 @ 05:23), Max: 98.6 (10-20-19 @ 05:23)  HR: 93 (10-20-19 @ 05:23) (93 - 93)  BP: 125/71 (10-20-19 @ 05:23) (125/71 - 125/71)  BP(mean): --  RR: 18 (10-20-19 @ 05:23) (18 - 18)  SpO2: 97% (10-20-19 @ 05:23) (97% - 97%)    General: Pt Alert and oriented, NAD, controlled pain.  Dressing C/D/I  Dressing removed  Incision healing well, Prineo in place  No active drainage or discharge  Op site dressing applied  Pulses: 2+ dorsalis pedis pulse. Cap refill < 2 sec.  Sensation: Grossly intact to light touch without deficit.  Motor: + EHL/FHL/TA/GS    A/P: 44yMale POD#2 s/p right distal femur ORIF     - Ice and Elevation encouraged  - Pain Control  - DVT ppx: Lovenox, SCD's  - PT/OT  - Weight bearing status: NWB RLE, Full ROM knee  - Continue care per Primary team  - DC planning  - Follow up with Dr. Madsen in office
INTERVAL HPI/OVERNIGHT EVENTS:  Patient seen and examined this morning, no acute events overnight  Pain controlled with medications  RLE in knee immobilizer palpable distal pulses  No other complaints  Denies fever, chills, SOB, chest pain or any other symptoms.     MEDICATIONS  (STANDING):  lactated ringers. 1000 milliLiter(s) (120 mL/Hr) IV Continuous <Continuous>  senna 2 Tablet(s) Oral at bedtime    MEDICATIONS  (PRN):  HYDROmorphone  Injectable 0.5 milliGRAM(s) IV Push every 3 hours PRN Severe Pain (7 - 10)  ondansetron Injectable 4 milliGRAM(s) IV Push every 6 hours PRN Nausea      Vital Signs Last 24 Hrs  T(C): 36.7 (18 Oct 2019 07:57), Max: 37.1 (17 Oct 2019 21:06)  T(F): 98.1 (18 Oct 2019 07:57), Max: 98.7 (17 Oct 2019 21:06)  HR: 92 (18 Oct 2019 07:57) (75 - 100)  BP: 130/72 (18 Oct 2019 07:57) (110/70 - 155/82)  BP(mean): --  RR: 18 (18 Oct 2019 07:57) (18 - 18)  SpO2: 99% (18 Oct 2019 07:57) (96% - 99%)    Physical Exam:    Gen: AAOX3, NAD  Head: AT/NC  Eyes: pupil 4mm bilaterally equally reactive, EOMI  Neck: no cervical spine tenderness or step-off  Chest: CTAB, no chest wall tenderness or crepitus  Abd: S, ND, NT  Pelvis: stable  Ext: RLE in a knee immobilizer, motor intact at the right foot, motor 5/5 on all other extremities, no deformities  Vasc: bounding pulses all throughout   Back: no spinal tenderness, no step off  Neuro: no focal deficits, sensory intact, CN II-XII intact    I&O's Detail      LABS:                        13.7   9.90  )-----------( 199      ( 18 Oct 2019 07:13 )             43.0     10-18    139  |  101  |  12.0  ----------------------------<  115  4.4   |  29.0  |  0.74    Ca    9.0      18 Oct 2019 07:13  Mg     2.1     10-18    TPro  7.4  /  Alb  4.3  /  TBili  <0.2<L>  /  DBili  x   /  AST  26  /  ALT  33  /  AlkPhos  79  10-17    PT/INR - ( 17 Oct 2019 16:36 )   PT: 10.7 sec;   INR: 0.93 ratio         PTT - ( 17 Oct 2019 16:36 )  PTT:26.8 sec
NADIR COLLINS    [id number]    Patient seen and examined status post right distal femur ORIF, POD #1. Patient is doing well. The patient's pain is controlled using the prescribed pain medications. The patient is participating in physical therapy. Denies nausea, vomiting, chest pain, shortness of breath, abdominal pain, LE N/T.  No new complaints.    Vital Signs Last 24 Hrs  T(C): 36.7 (19 Oct 2019 07:32), Max: 36.9 (19 Oct 2019 00:06)  T(F): 98.1 (19 Oct 2019 07:32), Max: 98.5 (19 Oct 2019 00:06)  HR: 94 (19 Oct 2019 07:32) (82 - 101)  BP: 141/71 (19 Oct 2019 07:32) (116/67 - 145/85)  BP(mean): --  RR: 18 (19 Oct 2019 07:32) (17 - 21)  SpO2: 96% (19 Oct 2019 07:32) (95% - 99%)                          12.0   12.84 )-----------( 186      ( 19 Oct 2019 06:28 )             38.0     10-19    140  |  102  |  11.0  ----------------------------<  138<H>  4.1   |  28.0  |  0.72    Ca    8.2<L>      19 Oct 2019 06:28  Phos  3.1     10-19  Mg     1.9     10-19    TPro  7.4  /  Alb  4.3  /  TBili  <0.2<L>  /  DBili  x   /  AST  26  /  ALT  33  /  AlkPhos  79  10-17      MEDICATIONS  (STANDING):  enoxaparin Injectable 30 milliGRAM(s) SubCutaneous two times a day  influenza   Vaccine 0.5 milliLiter(s) IntraMuscular once  senna 2 Tablet(s) Oral at bedtime    MEDICATIONS  (PRN):  acetaminophen   Tablet .. 650 milliGRAM(s) Oral every 4 hours PRN Temp greater or equal to 38C (100.4F), Mild Pain (1 - 3)  HYDROmorphone  Injectable 0.5 milliGRAM(s) IV Push every 4 hours PRN Breakthrough/Unrelieved pain  ondansetron Injectable 4 milliGRAM(s) IV Push once PRN Nausea and/or Vomiting  oxyCODONE    IR 5 milliGRAM(s) Oral every 4 hours PRN Moderate Pain (4 - 6)  oxyCODONE    IR 10 milliGRAM(s) Oral every 4 hours PRN Severe Pain (7 - 10)      Physical exam: NAD, resting comfortably  The right lower extremity dressing is clean, dry and intact. No drainage or discharge. No erythema is noted. No blistering. No ecchymosis. The calf is supple nontender. Passive range of motion is acceptable to due postoperative pain. No calf tenderness. Sensation to light touch is grossly intact distally. Motor function distally is 5/5. No foot drop. 2+ dorsalis pedis pulse. Capillary refill is less than 2 seconds. No cyanosis.    Primary Orthopedic Assessment:  • s/p RIGHT distal femur ORIF, POD # 1    Secondary  Orthopedic Assessment(s):   •     Secondary  Medical Assessment(s):   •     Plan:   • DVT prophylaxis: Lovenox in house, DC home on ASA, use of compression devices and ankle pumps  • Continue physical therapy  • physical therapy: non-weight bearing RLE, FROM of knee  • Incentive spirometry encouraged  • Pain control as clinically indicated  Discharge planning  continue care as per primary team
ORTHO-POST-OP PROGRESS NOTE:      [id number]    NADIRNITIN COLLINS      PROCEDURE: Open reduction internal fixation of Right distal femur  Surgeon: Dr. Madsen  DOS: 10/18/19     Patient seen and examined. Patient reports of moderate discomfort that is controlled by pain medications. Patient denies of acute sensory or motor changes.                             13.7   9.90  )-----------( 199      ( 18 Oct 2019 07:13 )             43.0               I&O's Detail    18 Oct 2019 07:01  -  18 Oct 2019 21:20  --------------------------------------------------------  IN:    lactated ringers.: 600 mL  Total IN: 600 mL    OUT:    Voided: 375 mL  Total OUT: 375 mL    Total NET: 225 mL            ceFAZolin   IVPB 2000 milliGRAM(s) IV Intermittent <User Schedule>  ondansetron Injectable 4 milliGRAM(s) IV Push once PRN  senna 2 Tablet(s) Oral at bedtime        T(C): 36.4 (10-18-19 @ 20:30), Max: 36.8 (10-17-19 @ 23:22)  HR: 101 (10-18-19 @ 20:30) (75 - 101)  BP: 145/85 (10-18-19 @ 20:30) (110/70 - 145/85)  RR: 18 (10-18-19 @ 20:30) (17 - 21)  SpO2: 96% (10-18-19 @ 20:30) (96% - 99%)  Wt(kg): --      PHYSICAL EXAM:     Constitutional: Alert, responsive, in no acute distress.     Right lower Extremity:   Dressing: Clean/dry/intact. No active bleeding or discharge noted. SILT without deficits. Calf soft nontender and compressible. +PF/DF/EHL/FHL. Dorsalis pedis pulse 2+. BCR.                                                               A/P :  44 y Male S/P ORIF Right distal femur   POD# 0    -  Pain control  -  DVT ppx: Lovenox 30BID, (ASA 325mg BID upon discharge)   -  PT and out of bed today  -  Postop abx: Ancef  -  Weight bearing status: NWB of RLE. FROM of knee allowed.  -  Dispo: discharge planning as per primary team
HPI/OVERNIGHT EVENTS:  No acute overnight events. Vital signs stable. POD 2 s/p ORIF of distal femur fracture. Pain controlled. Denies nausea, vomiting, fever, chills, chest pain, shortness of breath, or any new or concerning symptoms. Tolerating diet.    MEDICATIONS  (STANDING):  enoxaparin Injectable 30 milliGRAM(s) SubCutaneous two times a day  influenza   Vaccine 0.5 milliLiter(s) IntraMuscular once  senna 2 Tablet(s) Oral at bedtime    MEDICATIONS  (PRN):  acetaminophen   Tablet .. 650 milliGRAM(s) Oral every 4 hours PRN Temp greater or equal to 38C (100.4F), Mild Pain (1 - 3)  HYDROmorphone  Injectable 0.5 milliGRAM(s) IV Push every 4 hours PRN Breakthrough/Unrelieved pain  ondansetron Injectable 4 milliGRAM(s) IV Push once PRN Nausea and/or Vomiting  oxyCODONE    IR 5 milliGRAM(s) Oral every 4 hours PRN Moderate Pain (4 - 6)  oxyCODONE    IR 10 milliGRAM(s) Oral every 4 hours PRN Severe Pain (7 - 10)      Vital Signs Last 24 Hrs  T(C): 37.2 (20 Oct 2019 07:43), Max: 37.7 (19 Oct 2019 16:22)  T(F): 98.9 (20 Oct 2019 07:43), Max: 99.9 (19 Oct 2019 16:22)  HR: 90 (20 Oct 2019 07:43) (90 - 102)  BP: 144/84 (20 Oct 2019 07:43) (121/70 - 144/84)  BP(mean): --  RR: 19 (20 Oct 2019 07:43) (18 - 19)  SpO2: 99% (20 Oct 2019 07:43) (95% - 99%)    Constitutional: patient resting comfortably in bed, in no acute distress  HEENT: EOMI, no active drainage or redness  Neck: Full ROM without pain  Respiratory: respirations are unlabored, no accessory muscle use, no conversational dyspnea  Cardiovascular: regular rate & rhythm  Gastrointestinal: Abdomen soft, non-tender, non-distended  Neurological: A&O x 3; no gross sensory / motor / coordination deficits  Extremities: Palpable DP/PT b/l      I&O's Detail    19 Oct 2019 07:01  -  20 Oct 2019 07:00  --------------------------------------------------------  IN:  Total IN: 0 mL    OUT:    Voided: 3150 mL  Total OUT: 3150 mL    Total NET: -3150 mL          LABS:                        12.0   12.84 )-----------( 186      ( 19 Oct 2019 06:28 )             38.0     10-19    140  |  102  |  11.0  ----------------------------<  138<H>  4.1   |  28.0  |  0.72    Ca    8.2<L>      19 Oct 2019 06:28  Phos  3.1     10-19  Mg     1.9     10-19

## 2019-10-20 NOTE — PROGRESS NOTE ADULT - ASSESSMENT
43yo M who fell off loading truck while at work and found to have broken distal femur fracture s/p ORIF POD2. Feeling well with pain controlled.    - PT/OT  - DVT PPX  - Pain control  - NWB, Full ROM RLE  - Elevate RLE  - F/u ortho 11/8/19
This is a 43 yo M s/p trip and fall off a loading truck while at work today. Now POD#1 s/p ORIF of distal femur fx  Pain controlled, sensation/motor intact, compartments soft, no issues  Voiding with no problems  -Pain controlled   -NWB RLE, full ROM RLE, PT/OT, ASA for DVTppx, leg elevation,  f/u ortho 11/8/19  -DVT ppx
This is a 43 yo M s/p trip and fall off a loading truck while at work today. Now with a distal femur fx  -Pain controlled   -Keep RLE in knee immobilizer  -OR today with Ortho   -DVT/Antibiotic ppx postop  -Will need PT/OT post op

## 2019-10-20 NOTE — DISCHARGE NOTE NURSING/CASE MANAGEMENT/SOCIAL WORK - PATIENT PORTAL LINK FT
You can access the FollowMyHealth Patient Portal offered by [facility] by registering at the following website: [email or web address]. By joining [employer]’s FollowMyHealth portal, you will also be able to view your health information using other applications (apps) compatible with our system.

## 2019-11-01 PROBLEM — Z78.9 OTHER SPECIFIED HEALTH STATUS: Chronic | Status: ACTIVE | Noted: 2019-10-17

## 2019-11-04 PROBLEM — Z00.00 ENCOUNTER FOR PREVENTIVE HEALTH EXAMINATION: Status: ACTIVE | Noted: 2019-11-04

## 2019-11-05 ENCOUNTER — APPOINTMENT (OUTPATIENT)
Dept: ORTHOPEDIC SURGERY | Facility: CLINIC | Age: 44
End: 2019-11-05
Payer: OTHER MISCELLANEOUS

## 2019-11-05 DIAGNOSIS — Z78.9 OTHER SPECIFIED HEALTH STATUS: ICD-10-CM

## 2019-11-05 DIAGNOSIS — S72.409A UNSPECIFIED FRACTURE OF LOWER END OF UNSPECIFIED FEMUR, INITIAL ENCOUNTER FOR CLOSED FRACTURE: ICD-10-CM

## 2019-11-05 DIAGNOSIS — M25.561 PAIN IN RIGHT KNEE: ICD-10-CM

## 2019-11-05 PROCEDURE — 73560 X-RAY EXAM OF KNEE 1 OR 2: CPT | Mod: RT

## 2019-11-05 PROCEDURE — 99024 POSTOP FOLLOW-UP VISIT: CPT

## 2019-11-05 PROCEDURE — 73552 X-RAY EXAM OF FEMUR 2/>: CPT | Mod: RT

## 2019-11-05 RX ORDER — TRAMADOL HYDROCHLORIDE 50 MG/1
50 TABLET, COATED ORAL
Qty: 60 | Refills: 0 | Status: ACTIVE | COMMUNITY
Start: 2019-11-05 | End: 1900-01-01

## 2019-11-05 NOTE — DISCUSSION/SUMMARY
[de-identified] : 44 her old male with right distal femur fracture healing well. We will continue nonweightbearing. He is allowed full range of motion however. He is allowed to shower. He'll come back in about a month with repeat x-rays of the right knee. At that time we will likely advance his weightbearing. Physical therapy for range of motion encouraged now. \par \par The patient was given the opportunity to ask questions and all questions were answered to their satisfaction.\par \par Gopal Madsen MD\par Orthopaedic Trauma Surgeon\par [facility]\par [facility] [place]\par \par \par \par

## 2019-11-05 NOTE — HISTORY OF PRESENT ILLNESS
[de-identified] : the patient is a pleasant 44-year-old male who presents with a  today for followup after operative treatment of a right distal femur fracture.His surgery was about 3 weeks ago. He has been nonweightbearing with crutches since then. He is doing well. He has occasional pain medications at nighttime. 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 The patient states the pain is made worse with activity and relieved with rest. aching, 5/10 [Bending] : worsened by bending [Lifting] : worsened by lifting [Recumbency] : relieved by recumbency [Rest] : relieved by rest

## 2019-11-05 NOTE — PHYSICAL EXAM
[de-identified] : Physical Exam:\par General: Well appearing, no acute distress, A&O\par Neurologic: A&Ox3, No focal deficits\par Head: NCAT without abrasions, lacerations, or ecchymosis to head, face, or scalp\par Respiratory: Equal chest wall expansion bilaterally, no accessory muscle use\par Lymphatic: No lymphadenopathy palpated\par Skin: Warm and dry\par Psychiatric: Normal mood and affect\par \par RLE\par incision d/d/i\par SILT s/s/sp/dp/t\par Fires EHL/FHL/GS/TA\par 2+ DP/PT pulse\par brisk capillary refill [de-identified] : plain films of the right knee and right femur were obtained today. They show a chronic fracture dislocation with proximal femur malunion. The distal femur fracture status post ORIF is in anatomic alignment with no changes from postoperative imaging.

## 2019-11-05 NOTE — REASON FOR VISIT
[Initial Visit] : an initial visit for [Femur Fracture] : femur fracture [FreeTextEntry2] : Right knee pain

## 2019-11-14 ENCOUNTER — OTHER (OUTPATIENT)
Age: 44
End: 2019-11-14

## 2019-12-03 ENCOUNTER — APPOINTMENT (OUTPATIENT)
Dept: ORTHOPEDIC SURGERY | Facility: CLINIC | Age: 44
End: 2019-12-03
Payer: OTHER MISCELLANEOUS

## 2019-12-03 PROCEDURE — 73560 X-RAY EXAM OF KNEE 1 OR 2: CPT | Mod: RT

## 2019-12-03 PROCEDURE — 99024 POSTOP FOLLOW-UP VISIT: CPT

## 2019-12-03 NOTE — HISTORY OF PRESENT ILLNESS
[Doing Well] : is doing well [Excellent Pain Control] : has excellent pain control [de-identified] : s/p open reduction and internal fixation of right distal femur fracture. 10/18/19 [No Sign of Infection] : is showing no signs of infection [de-identified] : Physical Exam:\par General: Well appearing, no acute distress, A&O\par Neurologic: A&Ox3, No focal deficits\par Head: NCAT without abrasions, lacerations, or ecchymosis to head, face, or scalp\par Respiratory: Equal chest wall expansion bilaterally, no accessory muscle use\par Lymphatic: No lymphadenopathy palpated\par Skin: Warm and dry\par Psychiatric: Normal mood and affect\par \par RLE\par incision d/d/i\par SILT s/s/sp/dp/t\par Fires EHL/FHL/GS/TA\par 2+ DP/PT pulse\par brisk capillary refill  [de-identified] : the patient is a pleasant 44-year-old male who presents with two [place] translators today for followup after operative treatment of a right distal femur fracture.His surgery was about 2 months. He was suppsed to have been nonweightbearing but arrives today with a cane. He is doing well. He has occasional pain medications at nighttime. 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 The patient states the pain is made worse with activity and relieved with rest. aching, 3/10  [de-identified] : plain films of the right knee were obtained today. . The distal femur fracture status post ORIF is in anatomic alignment with no changes from postoperative imaging.  [de-identified] : 44 year old male with right distal femur fracture healing well. We will all him to advance to weightbearing. He is allowed full range of motion. He'll come back in about 6 weeks with repeat x-rays of the right knee.  Physical therapy for range of motion encouraged now. \par \par The patient was given the opportunity to ask questions and all questions were answered to their satisfaction.\par \par Gopal Madsen MD\par Orthopaedic Trauma Surgeon\par [facility]\par [facility] [place]\par

## 2020-01-02 ENCOUNTER — OTHER (OUTPATIENT)
Age: 45
End: 2020-01-02

## 2020-01-16 ENCOUNTER — APPOINTMENT (OUTPATIENT)
Dept: ORTHOPEDIC SURGERY | Facility: CLINIC | Age: 45
End: 2020-01-16
Payer: OTHER MISCELLANEOUS

## 2020-01-16 VITALS — SYSTOLIC BLOOD PRESSURE: 140 MMHG | DIASTOLIC BLOOD PRESSURE: 85 MMHG | HEART RATE: 104 BPM

## 2020-01-16 PROCEDURE — 73560 X-RAY EXAM OF KNEE 1 OR 2: CPT | Mod: RT,TC

## 2020-01-16 PROCEDURE — 99024 POSTOP FOLLOW-UP VISIT: CPT

## 2020-01-16 NOTE — HISTORY OF PRESENT ILLNESS
[Xray (Date:___)] : [unfilled] Xray -  [___ Months Post Op] : [unfilled] months post op [Healed] : healed [Neuro Intact] : an unremarkable neurological exam [Vascular Intact] : ~T peripheral vascular exam normal [No Sign of Infection] : is showing no signs of infection [Doing Well] : is doing well [Hardware in Good Position] : hardware in good position [Adequate Pain Control] : has adequate pain control [Chills] : no chills [Fever] : no fever [Erythema] : not erythematous [Swelling] : not swollen [Discharge] : absent of discharge [de-identified] : ORIF of right distal femur fracture.DOS:10/18/19\par  Right knee arthrotomy with removal of loose bodies.\par  [Dehiscence] : not dehisced [de-identified] : knee ROM 0-110,\par  [de-identified] : 46 yo m s/p ORIF of right distal femur fracture.DOS:10/18/19  Right knee arthrotomy with removal of loose bodies here for pop visit.   Patient is ambulating without assistive devices.  His pain is usually 2/10 and increases to 4/10 with walking.  He is not taking any pain meds at this time. He is receiving physical therapy.  He has not had any wound issues.  [de-identified] : Patient will continue with PT for WBAT, ROM, strengthening, modalities and work conditioning.  He will call or come to office for any issues, otherwise  he will return  to office in 6 weeks for eval and xrays right knee. [de-identified] :  right knee xray 2 views:

## 2020-02-25 ENCOUNTER — APPOINTMENT (OUTPATIENT)
Dept: ORTHOPEDIC SURGERY | Facility: CLINIC | Age: 45
End: 2020-02-25
Payer: OTHER MISCELLANEOUS

## 2020-02-25 DIAGNOSIS — S72.491D OTHER FRACTURE OF LOWER END OF RIGHT FEMUR, SUBSEQUENT ENCOUNTER FOR CLOSED FRACTURE WITH ROUTINE HEALING: ICD-10-CM

## 2020-02-25 PROCEDURE — 73560 X-RAY EXAM OF KNEE 1 OR 2: CPT | Mod: RT

## 2020-02-25 PROCEDURE — 99214 OFFICE O/P EST MOD 30 MIN: CPT

## 2020-02-25 NOTE — HISTORY OF PRESENT ILLNESS
[de-identified] : patient is a 45 year old male who is s/p Right femur ORIF on 10/18/19.  patient states pain is controlled, has pain with flexion.  no swelling,wound healed.  patient states PT finished, insurance will not allow any further.  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  [0] : a current pain level of 0/10 [Bending] : worsened by bending [Recumbency] : relieved by recumbency [Lifting] : worsened by lifting [Rest] : relieved by rest

## 2020-02-25 NOTE — PHYSICAL EXAM
[de-identified] : Physical Exam:\par General: Well appearing, no acute distress, A&O\par Neurologic: A&Ox3, No focal deficits\par Head: NCAT without abrasions, lacerations, or ecchymosis to head, face, or scalp\par Respiratory: Equal chest wall expansion bilaterally, no accessory muscle use\par Lymphatic: No lymphadenopathy palpated\par Skin: Warm and dry\par Psychiatric: Normal mood and affect\par \par RLE\par incision d/d/i\par SILT s/s/sp/dp/t\par Fires EHL/FHL/GS/TA\par 2+ DP/PT pulse\par brisk capillary refill [de-identified] : plain films of the right knee were obtained today. The distal femur fracture status post ORIF is in anatomic alignment with no changes from postoperative imaging.

## 2020-02-25 NOTE — DISCUSSION/SUMMARY
[de-identified] : 45 year old male with right distal femur fracture healing well. We will continue advancing to full weightbearing. He is allowed full range of motion however. He is allowed to shower. he may slowly return to all activities without restriction. Light-duty is probably best for starters but he may return to full duty when he feels comfortable. As long as he continues to do well, he may followup p.r.n.\par \par The patient was given the opportunity to ask questions and all questions were answered to their satisfaction.\par \par Gopal Madsen MD\par Orthopaedic Trauma Surgeon\par [facility]\par [facility] [place]\par \par \par \par

## 2021-06-18 NOTE — OCCUPATIONAL THERAPY INITIAL EVALUATION ADULT - REHAB POTENTIAL, OT EVAL
Refill policies:    • Allow 2-3 business days for refills; controlled substances may take longer.   • Contact your pharmacy at least 5 days prior to running out of medication and have them send an electronic request or submit request through the “request re Depending on your insurance carrier, approval may take 3-10 days. It is highly recommended patients contact their insurance carrier directly to determine coverage.   If test is done without insurance authorization, patient may be responsible for the entire good, to achieve stated therapy goals